# Patient Record
Sex: MALE | Race: WHITE | NOT HISPANIC OR LATINO
[De-identification: names, ages, dates, MRNs, and addresses within clinical notes are randomized per-mention and may not be internally consistent; named-entity substitution may affect disease eponyms.]

---

## 2021-10-12 PROBLEM — Z00.00 ENCOUNTER FOR PREVENTIVE HEALTH EXAMINATION: Status: ACTIVE | Noted: 2021-10-12

## 2021-10-13 ENCOUNTER — APPOINTMENT (OUTPATIENT)
Dept: ORTHOPEDIC SURGERY | Facility: CLINIC | Age: 56
End: 2021-10-13
Payer: COMMERCIAL

## 2021-10-13 VITALS — WEIGHT: 180 LBS | HEIGHT: 73 IN | BODY MASS INDEX: 23.86 KG/M2

## 2021-10-13 DIAGNOSIS — M25.572 PAIN IN LEFT ANKLE AND JOINTS OF LEFT FOOT: ICD-10-CM

## 2021-10-13 PROCEDURE — 99204 OFFICE O/P NEW MOD 45 MIN: CPT

## 2021-10-13 PROCEDURE — 73630 X-RAY EXAM OF FOOT: CPT | Mod: LT

## 2021-10-13 NOTE — REASON FOR VISIT
[Initial Visit] : an initial visit for [FreeTextEntry2] : LEFT FOOT PAIN - STARTED DURING QUARANTINE AND NOW WORSE WHILE WALKING - SENT FOR NEW XRAYS

## 2021-10-13 NOTE — PHYSICAL EXAM
[de-identified] : Left foot shows no obvious warmth or swelling. Some tenderness over the anterior lateral aspect of the ankle and foot. No bony tenderness. Some mild tenderness over the sinus Tarsi. His range of motion is normal neurovascular exam is normal. [de-identified] : Left foot x-ray shows no evidence of acute fracture or dislocation. There is an old healed fifth metatarsal base fracture and he does have an os perineum

## 2021-10-13 NOTE — HISTORY OF PRESENT ILLNESS
[de-identified] : This patient is a 56-year-old male with chief complaint of some left foot pain. There is no specific injury but he has been walking more and walking with his dog symptoms 3-4 miles. Spin worse over the last couple weeks. He had a previous fracture many years ago. He's had noted some swelling. There is no numbness or tingling other than he does have some diabetic neuropathy which is old

## 2021-10-13 NOTE — DISCUSSION/SUMMARY
[Medication Risks Reviewed] : Medication risks reviewed [de-identified] : Most likely this is a soft tissue inflammation. I recommended he take 2 Advil 3 times a day with food for 10 days. He will ice couple times a day for 15 minutes. If in a few weeks the symptoms have not play he will call me and then we will do an MRI of his foot. Followup will be as needed otherwise.

## 2024-01-22 ENCOUNTER — NON-APPOINTMENT (OUTPATIENT)
Age: 59
End: 2024-01-22

## 2024-01-25 ENCOUNTER — APPOINTMENT (OUTPATIENT)
Dept: OPHTHALMOLOGY | Facility: CLINIC | Age: 59
End: 2024-01-25
Payer: COMMERCIAL

## 2024-01-25 ENCOUNTER — NON-APPOINTMENT (OUTPATIENT)
Age: 59
End: 2024-01-25

## 2024-01-25 PROCEDURE — 92002 INTRM OPH EXAM NEW PATIENT: CPT

## 2025-01-23 ENCOUNTER — NON-APPOINTMENT (OUTPATIENT)
Age: 60
End: 2025-01-23

## 2025-01-23 ENCOUNTER — APPOINTMENT (OUTPATIENT)
Dept: OPHTHALMOLOGY | Facility: CLINIC | Age: 60
End: 2025-01-23
Payer: COMMERCIAL

## 2025-01-23 PROCEDURE — 92012 INTRM OPH EXAM EST PATIENT: CPT
